# Patient Record
Sex: FEMALE | Race: WHITE
[De-identification: names, ages, dates, MRNs, and addresses within clinical notes are randomized per-mention and may not be internally consistent; named-entity substitution may affect disease eponyms.]

---

## 2017-02-20 ENCOUNTER — HOSPITAL ENCOUNTER (OUTPATIENT)
Dept: HOSPITAL 45 - C.ULTRBC | Age: 62
Discharge: HOME | End: 2017-02-20
Attending: INTERNAL MEDICINE
Payer: COMMERCIAL

## 2017-02-20 DIAGNOSIS — R10.9: Primary | ICD-10-CM

## 2017-02-20 NOTE — DIAGNOSTIC IMAGING REPORT
BILIARY ULTRASOUND



CLINICAL HISTORY: R10.9 Abdominal pain    



COMPARISON STUDY:  No previous studies for comparison.



FINDINGS: The liver is of increased echogenicity, nonspecific finding most often

seen in hepatic steatosis. No focal masses are visualized. There is no ductal

dilatation. The common bile duct measures 6 mm. No gallstones are visualized.

There is no gallbladder wall thickening. There is no right-sided hydronephrosis.

The pancreas appears normal as visualized.



IMPRESSION:  

1. Increased hepatic echogenicity, and mild hepatomegaly. The findings likely

indicate hepatic steatosis

2. Ultrasonographically normal gallbladder. No evidence of ductal dilatation. 









Electronically signed by:  Lazarus Bishop M.D.

2/20/2017 9:43 AM



Dictated Date/Time:  2/20/2017 9:42 AM

## 2017-05-05 ENCOUNTER — HOSPITAL ENCOUNTER (OUTPATIENT)
Dept: HOSPITAL 45 - C.LABBC | Age: 62
Discharge: HOME | End: 2017-05-05
Attending: INTERNAL MEDICINE
Payer: COMMERCIAL

## 2017-05-05 DIAGNOSIS — E03.9: ICD-10-CM

## 2017-05-05 DIAGNOSIS — N18.3: ICD-10-CM

## 2017-05-05 DIAGNOSIS — E11.22: Primary | ICD-10-CM

## 2017-05-05 LAB
ALBUMIN/GLOB SERPL: 1.1 {RATIO} (ref 0.9–2)
ALP SERPL-CCNC: 69 U/L (ref 45–117)
ALT SERPL-CCNC: 54 U/L (ref 12–78)
ANION GAP SERPL CALC-SCNC: 8 MMOL/L (ref 3–11)
AST SERPL-CCNC: 24 U/L (ref 15–37)
BASOPHILS # BLD: 0.06 K/UL (ref 0–0.2)
BASOPHILS NFR BLD: 0.6 %
BUN SERPL-MCNC: 18 MG/DL (ref 7–18)
BUN/CREAT SERPL: 13.9 (ref 10–20)
CALCIUM SERPL-MCNC: 10.4 MG/DL (ref 8.5–10.1)
CHLORIDE SERPL-SCNC: 105 MMOL/L (ref 98–107)
CO2 SERPL-SCNC: 26 MMOL/L (ref 21–32)
COMPLETE: YES
CREAT SERPL-MCNC: 1.3 MG/DL (ref 0.6–1.2)
CREAT UR-MCNC: 140 MG/DL
EOSINOPHIL NFR BLD AUTO: 383 K/UL (ref 130–400)
EST. AVERAGE GLUCOSE BLD GHB EST-MCNC: 148 MG/DL
GLOBULIN SER-MCNC: 3.7 GM/DL (ref 2.5–4)
GLUCOSE SERPL-MCNC: 131 MG/DL (ref 70–99)
HCT VFR BLD CALC: 40.9 % (ref 37–47)
IG%: 0.2 %
IMM GRANULOCYTES NFR BLD AUTO: 29.6 %
LYMPHOCYTES # BLD: 2.88 K/UL (ref 1.2–3.4)
MAGNESIUM SERPL-MCNC: 1.9 MG/DL (ref 1.8–2.4)
MCH RBC QN AUTO: 30.6 PG (ref 25–34)
MCHC RBC AUTO-ENTMCNC: 32.3 G/DL (ref 32–36)
MCV RBC AUTO: 94.9 FL (ref 80–100)
MONOCYTES NFR BLD: 9.5 %
NEUTROPHILS # BLD AUTO: 2.8 %
NEUTROPHILS NFR BLD AUTO: 57.3 %
PMV BLD AUTO: 11.5 FL (ref 7.4–10.4)
POTASSIUM SERPL-SCNC: 4.5 MMOL/L (ref 3.5–5.1)
RATIO: 30.1 MCG/MG (ref 0–30)
RBC # BLD AUTO: 4.31 M/UL (ref 4.2–5.4)
SODIUM SERPL-SCNC: 139 MMOL/L (ref 136–145)
TSH SERPL-ACNC: 0.93 UIU/ML (ref 0.3–4.5)
WBC # BLD AUTO: 9.74 K/UL (ref 4.8–10.8)

## 2017-06-12 ENCOUNTER — HOSPITAL ENCOUNTER (OUTPATIENT)
Dept: HOSPITAL 45 - C.LABBC | Age: 62
Discharge: HOME | End: 2017-06-12
Attending: INTERNAL MEDICINE
Payer: COMMERCIAL

## 2017-06-12 DIAGNOSIS — M79.89: Primary | ICD-10-CM

## 2017-06-22 ENCOUNTER — HOSPITAL ENCOUNTER (OUTPATIENT)
Dept: HOSPITAL 45 - C.RADBC | Age: 62
Discharge: HOME | End: 2017-06-22
Attending: INTERNAL MEDICINE
Payer: COMMERCIAL

## 2017-06-22 DIAGNOSIS — M25.561: Primary | ICD-10-CM

## 2017-06-22 NOTE — DIAGNOSTIC IMAGING REPORT
RIGHT KNEE 2 VIEWS



HISTORY:      M25.561 Right knee [id number]

Right



COMPARISON: None.



FINDINGS: There is no fracture or dislocation. Soft tissues are unremarkable. No

radiopaque foreign bodies. No knee effusion. Tiny marginal osteophytes at the

medial and patellofemoral compartments.



IMPRESSION:  

No fractures. Minimal degenerative change.







Electronically signed by:  Clovis More M.D.

6/22/2017 9:29 AM



Dictated Date/Time:  6/22/2017 9:28 AM

## 2017-09-19 ENCOUNTER — HOSPITAL ENCOUNTER (OUTPATIENT)
Dept: HOSPITAL 45 - C.PATHSPEC | Age: 62
Discharge: HOME | End: 2017-09-19
Payer: COMMERCIAL

## 2017-09-19 DIAGNOSIS — D10.39: Primary | ICD-10-CM

## 2017-12-07 ENCOUNTER — HOSPITAL ENCOUNTER (OUTPATIENT)
Dept: HOSPITAL 45 - C.LABBC | Age: 62
Discharge: HOME | End: 2017-12-07
Attending: PHYSICIAN ASSISTANT
Payer: COMMERCIAL

## 2017-12-07 DIAGNOSIS — E78.5: ICD-10-CM

## 2017-12-07 DIAGNOSIS — E11.9: Primary | ICD-10-CM

## 2017-12-07 DIAGNOSIS — I10: ICD-10-CM

## 2017-12-07 LAB
ALBUMIN/GLOB SERPL: 1 {RATIO} (ref 0.9–2)
ALP SERPL-CCNC: 64 U/L (ref 45–117)
ALT SERPL-CCNC: 35 U/L (ref 12–78)
ANION GAP SERPL CALC-SCNC: 4 MMOL/L (ref 3–11)
AST SERPL-CCNC: 14 U/L (ref 15–37)
BUN SERPL-MCNC: 26 MG/DL (ref 7–18)
BUN/CREAT SERPL: 19.8 (ref 10–20)
CALCIUM SERPL-MCNC: 9.4 MG/DL (ref 8.5–10.1)
CHLORIDE SERPL-SCNC: 106 MMOL/L (ref 98–107)
CHOLEST/HDLC SERPL: 3 {RATIO}
CO2 SERPL-SCNC: 26 MMOL/L (ref 21–32)
CREAT SERPL-MCNC: 1.32 MG/DL (ref 0.6–1.2)
EST. AVERAGE GLUCOSE BLD GHB EST-MCNC: 143 MG/DL
GLOBULIN SER-MCNC: 3.7 GM/DL (ref 2.5–4)
GLUCOSE SERPL-MCNC: 116 MG/DL (ref 70–99)
GLUCOSE UR QL: 49 MG/DL
KETONES UR QL STRIP: 65 MG/DL
NITRITE UR QL STRIP: 165 MG/DL (ref 0–150)
PH UR: 147 MG/DL (ref 0–200)
POTASSIUM SERPL-SCNC: 4.7 MMOL/L (ref 3.5–5.1)
SODIUM SERPL-SCNC: 136 MMOL/L (ref 136–145)
VERY LOW DENSITY LIPOPROT CALC: 33 MG/DL

## 2018-02-21 ENCOUNTER — HOSPITAL ENCOUNTER (OUTPATIENT)
Dept: HOSPITAL 45 - C.GI | Age: 63
Discharge: HOME | End: 2018-02-21
Attending: INTERNAL MEDICINE
Payer: COMMERCIAL

## 2018-02-21 VITALS
BODY MASS INDEX: 46.31 KG/M2 | BODY MASS INDEX: 46.31 KG/M2 | WEIGHT: 274.59 LBS | HEIGHT: 64.49 IN | WEIGHT: 274.59 LBS | HEIGHT: 64.49 IN

## 2018-02-21 VITALS — HEART RATE: 73 BPM | SYSTOLIC BLOOD PRESSURE: 124 MMHG | DIASTOLIC BLOOD PRESSURE: 55 MMHG | OXYGEN SATURATION: 96 %

## 2018-02-21 DIAGNOSIS — Z88.5: ICD-10-CM

## 2018-02-21 DIAGNOSIS — E03.9: ICD-10-CM

## 2018-02-21 DIAGNOSIS — E78.00: ICD-10-CM

## 2018-02-21 DIAGNOSIS — Z88.2: ICD-10-CM

## 2018-02-21 DIAGNOSIS — K21.9: ICD-10-CM

## 2018-02-21 DIAGNOSIS — E66.01: ICD-10-CM

## 2018-02-21 DIAGNOSIS — Z79.84: ICD-10-CM

## 2018-02-21 DIAGNOSIS — G47.30: ICD-10-CM

## 2018-02-21 DIAGNOSIS — I10: ICD-10-CM

## 2018-02-21 DIAGNOSIS — Z83.71: ICD-10-CM

## 2018-02-21 DIAGNOSIS — Z90.89: ICD-10-CM

## 2018-02-21 DIAGNOSIS — Z12.11: Primary | ICD-10-CM

## 2018-02-21 DIAGNOSIS — Z86.010: ICD-10-CM

## 2018-02-21 DIAGNOSIS — E11.9: ICD-10-CM

## 2018-02-21 DIAGNOSIS — Z79.82: ICD-10-CM

## 2018-02-21 DIAGNOSIS — Z95.0: ICD-10-CM

## 2018-02-21 DIAGNOSIS — K63.5: ICD-10-CM

## 2018-02-21 DIAGNOSIS — Z87.891: ICD-10-CM

## 2018-02-21 DIAGNOSIS — Z90.711: ICD-10-CM

## 2018-02-21 DIAGNOSIS — Z88.1: ICD-10-CM

## 2018-02-21 DIAGNOSIS — E78.5: ICD-10-CM

## 2018-02-21 NOTE — ENDO HISTORY AND PHYSICAL
History & Physical


Date of Service:


Feb 21, 2018.


Chief Complaint:


Screening


Referring Physician:


Garrett DWYER


History of Present Illness


Hx of polyps for Colonoscopy





Past Medical History


Diabetes, Pacemaker, Reflux, Gynecological Problems, High Cholesterol, Sleep 

Apnea, Heart Disease, Hypertension, Implantable Defibrillator, Thyroid Disease





Past Surgical History


Hx Cardiac Surgery:  Yes (PACE/DEFIB-2010)


Hx Internal Defibrillator:  Yes


Hx Pacemaker:  Yes


Hx Abdominal Surgery:  Yes (APPY, D&C, PARTIAL HYSTER, TUBAL LIGATION, OVARIAN 

CYST REMOVAL)


Hx of Implantable Prosthesis:  No


Hx Post-Op Nausea and Vomiting:  Yes


Hx Cancer Surgery:  No


Hx Thoracic Surgery:  No


Hx Orthopedic:  Yes (RT FOOT SURGERY)


Hx Urinary Tract Surgery:  No





Family History


Polyp





Social History


Smoking Status:  Former Smoker


Hx Substance Use:  No


Hx Alcohol Use:  No





Allergies


Coded Allergies:  


     Sulfa Antibiotics (Verified  Allergy, Unknown, RASH, 2/2/18)


     Tetracycline (Verified  Allergy, Unknown, RASH, 2/2/18)


     Codeine (Verified  Adverse Reaction, Unknown, N/V, 2/2/18)





Current Medications





Reported Home Medications








 Medications  Dose


 Route/Sig


 Max Daily Dose Days Date Category


 


 Allegra Allergy


  (Fexofenadine


 Hcl) 180 Mg Tab  1 Tab


 PO DAILY PRN


    2/2/18 Reported


 


 Vitamin B Complex


  (B-Complex


 Vitamins) 1 Tab


 Tab  1 Tab


 PO QAM


    2/2/18 Reported


 


 Pepcid


  (Famotidine) 20


 Mg Tab  20 Mg


 PO HS


    2/2/18 Reported


 


 Aldactone


  (Spironolactone)


 25 Mg Tab  25 Mg


 PO Q2D


    2/2/18 Reported


 


 Zocor


  (Simvastatin) 20


 Mg Tab  20 Mg


 PO QPM


    2/2/18 Reported


 


 Paxil (Paroxetine


 Hcl) 10 Mg Tab  10 Mg


 PO QPM


    2/2/18 Reported


 


 Protonix


  (Pantoprazole) 40


 Mg Tab  40 Mg


 PO BID


    2/2/18 Reported


 


 Omega 3 (Omega-3


 Fatty Acids) 1


 Cap Cap  1 Cap


 PO BID


    2/2/18 Reported


 


 Multivitamin


  (Multiple


 Vitamin) 1 Tab Tab  1 Tab


 PO QPM


    2/2/18 Reported


 


 Glucophage


  (Metformin Hcl)


 500 Mg Tab  500 Mg


 PO BID


    2/2/18 Reported


 


 Mag-Ox (Magnesium


 Oxide) 400 Mg Tab  400 Mg


 PO QPM


    2/2/18 Reported


 


 Zestril


  (Lisinopril) 5 Mg


 Tab  5 Mg


 PO QAM


    2/2/18 Reported


 


 Levothyroxine


 Sodium 100 Mcg Tab  1 Tab


 PO QAM


    2/2/18 Reported


 


 Glimepiride 2 Mg


 Tab  1 Tab


 PO QPM


    2/2/18 Reported


 


 Gaviscon


  (Aluminum


 Hydroxide-Mag


 Trisil) 1 Chw Chw  1 Dose


 PO AS DIRECTED PRN


    2/2/18 Reported


 


 Coreg


  (Carvedilol) 12.5


 Mg Tab  12.5 Mg


 PO BID


    2/2/18 Reported


 


 Calcium 500 +D3


 500-600 mg-Unit


  (Calcium


 Carbonate-Cholecalcife)


 1 Tab Tab  1 Tab


 PO QPM


    2/2/18 Reported


 


 Biotin 1 Mg Cap  1 Cap


 PO QAM


    2/2/18 Reported


 


 Aspirin Ec


  (Aspirin) 81 Mg


 Tab  81 Mg


 PO HS


    4/27/16 Reported











Vital Signs


Weight (Kilograms):  124.55


Height (Feet):  5


Height (Inches):  4.5











  Date Time  Temp Pulse Resp B/P (MAP) Pulse Ox O2 Delivery O2 Flow Rate FiO2


 


2/21/18 13:15 36.6 70 18 127/86 (100) 94 Room Air  











Physical Exam


General Appearance:  + obese


Respiratory/Chest:  


   Respiratory effort:  no dyspnea


Cardiovascular:  


   Heart Auscultation:  RRR


Abdomen:  


   Inspection & Palpation:  soft





Assessment and Plan


Hx of polyps for colonoscopy

## 2018-02-21 NOTE — GI REPORT
Procedure Date: 2/21/2018 1:37 PM

Procedure:            Colonoscopy

Indications:          Personal history of colonic polyps

Medicines:            Propofol total dose 200 mg IV, Lidocaine 40

                      mg IV

Complications:        No immediate complications.

Estimated Blood Loss: Estimated blood loss was minimal.

Procedure:            Pre-Anesthesia Assessment:

                      - Prior to the procedure, a History and Physical was 

                      performed, and patient medications, allergies and 

                      sensitivities were reviewed. The patient's tolerance of 

                      previous anesthesia was reviewed.

                      - The risks and benefits of the procedure and the 

                      sedation options and risks were discussed with the 

                      patient. All questions were answered and informed 

                      consent was obtained.

                      After I obtained informed consent, the scope was passed 

                      under direct vision. Throughout the procedure, the 

                      patient's blood pressure, pulse, and oxygen saturations 

                      were monitored continuously. The Scope was introduced 

                      through the anus and advanced to the cecum, identified 

                      by appendiceal orifice and ileocecal valve. The 

                      colonoscopy was performed without difficulty. The 

                      patient tolerated the procedure well. The quality of 

                      the bowel preparation was good.

Findings:

     A 3 mm polyp was found in the splenic flexure. The polyp was sessile. 

     The polyp was removed with a cold biopsy forceps. Resection and 

     retrieval were complete. Estimated blood loss was minimal.

Impression:           - One 3 mm polyp at the splenic flexure, removed with a 

                      cold biopsy forceps. Resected and retrieved.

Recommendation:       - Discharge patient to home (ambulatory).

                      - Continue present medications.

                      - Await pathology results.

                      - Return to primary care physician PRN.

Franklin Lubin M.D.

Franklin Lubin MD

2/21/2018 2:07:29 PM

This report has been signed electronically.

Note Initiated On: 2/21/2018 1:37 PM

     I attest to the content of the Intraoperative Record and orders 

     documented therein, exceptions below

## 2018-02-21 NOTE — ANESTHESIOLOGY PROGRESS NOTE
Anesthesia Post Op Note


Date & Time


Feb 21, 2018 at 14:25





Vital Signs


Pain Intensity:  0





Vital Signs Past 12 Hours








  Date Time  Temp Pulse Resp B/P (MAP) Pulse Ox O2 Delivery O2 Flow Rate FiO2


 


2/21/18 14:13  73 18 110/69 (83) 96 Room Air  


 


2/21/18 13:15 36.6 70 18 127/86 (100) 94 Room Air  











Notes


Mental Status:  alert / awake / arousable, participated in evaluation


Pt Amnestic to Procedure:  Yes


Nausea / Vomiting:  adequately controlled


Pain:  adequately controlled


Airway Patency, RR, SpO2:  stable & adequate


BP & HR:  stable & adequate


Hydration State:  stable & adequate


Anesthetic Complications:  no major complications apparent

## 2018-02-21 NOTE — DISCHARGE INSTRUCTIONS
Endoscopy Patient Instructions


Date / Procedure(s) Performed


Feb 21, 2018.


Colonoscopy





Allergy Information


Coded Allergies:  


     Sulfa Antibiotics (Verified  Allergy, Unknown, RASH, 2/2/18)


     Tetracycline (Verified  Allergy, Unknown, RASH, 2/2/18)


     Codeine (Verified  Adverse Reaction, Unknown, N/V, 2/2/18)





Discharge Date / Findings


Feb 21, 2018.


polyp





Medication Instructions


Restart Stopped Medication(s):


resume meds





Reported Home Medications








 Medications  Dose


 Route/Sig


 Max Daily Dose Days Date Category


 


 Allegra Allergy


  (Fexofenadine


 Hcl) 180 Mg Tab  1 Tab


 PO DAILY PRN


    2/2/18 Reported


 


 Vitamin B Complex


  (B-Complex


 Vitamins) 1 Tab


 Tab  1 Tab


 PO QAM


    2/2/18 Reported


 


 Pepcid


  (Famotidine) 20


 Mg Tab  20 Mg


 PO HS


    2/2/18 Reported


 


 Aldactone


  (Spironolactone)


 25 Mg Tab  25 Mg


 PO Q2D


    2/2/18 Reported


 


 Zocor


  (Simvastatin) 20


 Mg Tab  20 Mg


 PO QPM


    2/2/18 Reported


 


 Paxil (Paroxetine


 Hcl) 10 Mg Tab  10 Mg


 PO QPM


    2/2/18 Reported


 


 Protonix


  (Pantoprazole) 40


 Mg Tab  40 Mg


 PO BID


    2/2/18 Reported


 


 Omega 3 (Omega-3


 Fatty Acids) 1


 Cap Cap  1 Cap


 PO BID


    2/2/18 Reported


 


 Multivitamin


  (Multiple


 Vitamin) 1 Tab Tab  1 Tab


 PO QPM


    2/2/18 Reported


 


 Glucophage


  (Metformin Hcl)


 500 Mg Tab  500 Mg


 PO BID


    2/2/18 Reported


 


 Mag-Ox (Magnesium


 Oxide) 400 Mg Tab  400 Mg


 PO QPM


    2/2/18 Reported


 


 Zestril


  (Lisinopril) 5 Mg


 Tab  5 Mg


 PO QAM


    2/2/18 Reported


 


 Levothyroxine


 Sodium 100 Mcg Tab  1 Tab


 PO QAM


    2/2/18 Reported


 


 Glimepiride 2 Mg


 Tab  1 Tab


 PO QPM


    2/2/18 Reported


 


 Gaviscon


  (Aluminum


 Hydroxide-Mag


 Trisil) 1 Chw Chw  1 Dose


 PO AS DIRECTED PRN


    2/2/18 Reported


 


 Coreg


  (Carvedilol) 12.5


 Mg Tab  12.5 Mg


 PO BID


    2/2/18 Reported


 


 Calcium 500 +D3


 500-600 mg-Unit


  (Calcium


 Carbonate-Cholecalcife)


 1 Tab Tab  1 Tab


 PO QPM


    2/2/18 Reported


 


 Biotin 1 Mg Cap  1 Cap


 PO QAM


    2/2/18 Reported


 


 Aspirin Ec


  (Aspirin) 81 Mg


 Tab  81 Mg


 PO HS


    4/27/16 Reported











Provider Instructions





Activity Restrictions





-  No exercising or heavy lifting for 24 hours. 


-  Do not drink alcohol the day of the procedure.


-  Do not drive a car or operate machinery until the day after the procedure.


-  Do not make any important decisions or sign important papers in 24 hours 

after the procedure.





Following Day:





-  Return to full activity which may include returning to work/school.





Diet





Start your diet with liquids and light foods (jello, soup, juice, toast).  Then 

eat your usual diet if not nauseated.





Treatment For Common After Affects





For mild abdominal pain, bloating, or excessive gas:





-  Rest


-  Eat lightly


-  Lie on right side





Follow-Up Information


Follow-up with Garrett DWYER as scheduled





Anesthesia Information





What You Should Know





You have had a procedure that required some medicine to reduce anxiety and 

discomfort. This treatment is called moderate sedation.  


After receiving the treatment, you may be sleepy, but you will be able to 

breathe on your own.  The effects of the treatment may last for several hours.








Follow these instructions along with Activity/Diet recommendations noted above:





*  Do NOT do anything where dizziness or clumsiness would be dangerous.





*  Rest quietly at home today, then you can be up and about tomorrow.





*  Have a responsible person stay with you the rest of today.





*  You may have had an I.V. today.  If so, you may take the dressing off later 

today.





Recommendations


 


Call your doctor if:





*  Trouble breathing 





*  Continuous vomiting for more than 24 hours








*  Temperature above 101 degrees





*  Severe abdominal pain or bloating





*  Pain not relieved by pain medicine ordered





*  There is increased drainage or redness from any incision





*  A large amount of rectal bleeding greater than 2-3 tablespoons. 


   (If you had a polyp/s removed or have hemorrhoids, a small amount of blood -


    from the rectum is to be expected.)





*  You have any unanswered questions or concerns.








IN THE EVENT OF A SERIOUS EMERGENCY, GO TO THE NEAREST EMERGENCY ROOM








       Your discharge instructions were prepared by provider Franklin Lubin.





 Patient Instructions Signature Page














Rebecca Weiner 











Patient (or Guardian) Signature/Date:____________________________________ I 

have read and understand the instructions given to me by my caregivers.








Caregiver/RN/Doctor Signature/Date:____________________________________











The above-named patient and/or guardian has received patient instructions on 

this date.





























+  Original Patient Signature Page (only) stays with chart.  Please make copy 

for patient.

## 2018-04-10 ENCOUNTER — HOSPITAL ENCOUNTER (OUTPATIENT)
Dept: HOSPITAL 45 - C.LABBC | Age: 63
Discharge: HOME | End: 2018-04-10
Attending: NURSE PRACTITIONER
Payer: COMMERCIAL

## 2018-04-10 DIAGNOSIS — E03.9: ICD-10-CM

## 2018-04-10 DIAGNOSIS — E11.29: ICD-10-CM

## 2018-04-10 DIAGNOSIS — N18.3: ICD-10-CM

## 2018-04-10 DIAGNOSIS — I12.9: ICD-10-CM

## 2018-04-10 DIAGNOSIS — E11.22: Primary | ICD-10-CM

## 2018-04-10 LAB
ALBUMIN SERPL-MCNC: 4.1 GM/DL (ref 3.4–5)
ALP SERPL-CCNC: 63 U/L (ref 45–117)
ALT SERPL-CCNC: 39 U/L (ref 12–78)
AST SERPL-CCNC: 19 U/L (ref 15–37)
BUN SERPL-MCNC: 23 MG/DL (ref 7–18)
CALCIUM SERPL-MCNC: 9.6 MG/DL (ref 8.5–10.1)
CO2 SERPL-SCNC: 29 MMOL/L (ref 21–32)
CREAT SERPL-MCNC: 1.33 MG/DL (ref 0.6–1.2)
CREAT UR-MCNC: 65.3 MG/DL
GLUCOSE SERPL-MCNC: 103 MG/DL (ref 70–99)
HBA1C MFR BLD: 6.7 % (ref 4.5–5.6)
POTASSIUM SERPL-SCNC: 4.5 MMOL/L (ref 3.5–5.1)
PROT SERPL-MCNC: 7.7 GM/DL (ref 6.4–8.2)
SODIUM SERPL-SCNC: 138 MMOL/L (ref 136–145)

## 2018-08-08 ENCOUNTER — HOSPITAL ENCOUNTER (OUTPATIENT)
Dept: HOSPITAL 45 - C.LABBC | Age: 63
Discharge: HOME | End: 2018-08-08
Attending: NURSE PRACTITIONER
Payer: COMMERCIAL

## 2018-08-08 DIAGNOSIS — E11.9: ICD-10-CM

## 2018-08-08 DIAGNOSIS — I10: ICD-10-CM

## 2018-08-08 DIAGNOSIS — E78.5: Primary | ICD-10-CM

## 2018-08-08 LAB
ALBUMIN SERPL-MCNC: 4.1 GM/DL (ref 3.4–5)
ALP SERPL-CCNC: 61 U/L (ref 45–117)
ALT SERPL-CCNC: 46 U/L (ref 12–78)
AST SERPL-CCNC: 24 U/L (ref 15–37)
BUN SERPL-MCNC: 16 MG/DL (ref 7–18)
CALCIUM SERPL-MCNC: 9.7 MG/DL (ref 8.5–10.1)
CO2 SERPL-SCNC: 26 MMOL/L (ref 21–32)
CREAT SERPL-MCNC: 1.12 MG/DL (ref 0.6–1.2)
GLUCOSE SERPL-MCNC: 108 MG/DL (ref 70–99)
HBA1C MFR BLD: 6.8 % (ref 4.5–5.6)
POTASSIUM SERPL-SCNC: 4.6 MMOL/L (ref 3.5–5.1)
PROT SERPL-MCNC: 7.7 GM/DL (ref 6.4–8.2)
SODIUM SERPL-SCNC: 139 MMOL/L (ref 136–145)